# Patient Record
Sex: MALE | Race: WHITE | NOT HISPANIC OR LATINO | URBAN - METROPOLITAN AREA
[De-identification: names, ages, dates, MRNs, and addresses within clinical notes are randomized per-mention and may not be internally consistent; named-entity substitution may affect disease eponyms.]

---

## 2017-07-19 ENCOUNTER — GENERIC CONVERSION - ENCOUNTER (OUTPATIENT)
Dept: OTHER | Facility: OTHER | Age: 82
End: 2017-07-19

## 2017-07-20 ENCOUNTER — GENERIC CONVERSION - ENCOUNTER (OUTPATIENT)
Dept: OTHER | Facility: OTHER | Age: 82
End: 2017-07-20

## 2017-09-13 ENCOUNTER — ALLSCRIPTS OFFICE VISIT (OUTPATIENT)
Dept: OTHER | Facility: OTHER | Age: 82
End: 2017-09-13

## 2017-09-13 DIAGNOSIS — I35.1 NONRHEUMATIC AORTIC VALVE INSUFFICIENCY: ICD-10-CM

## 2017-09-20 ENCOUNTER — HOSPITAL ENCOUNTER (OUTPATIENT)
Dept: NON INVASIVE DIAGNOSTICS | Facility: CLINIC | Age: 82
Discharge: HOME | End: 2017-09-20
Payer: MEDICARE

## 2017-09-20 DIAGNOSIS — I35.1 NONRHEUMATIC AORTIC VALVE INSUFFICIENCY: ICD-10-CM

## 2017-09-20 LAB
ARRHY DURING EX: NORMAL
CHEST PAIN STATEMENT: NORMAL
MAX DIASTOLIC BP: 70 MMHG
MAX HEART RATE: 78 BPM
MAX PREDICTED HEART RATE: 133 BPM
MAX. SYSTOLIC BP: 160 MMHG
PROTOCOL NAME: NORMAL
REASON FOR TERMINATION: NORMAL
TARGET HR FORMULA: NORMAL
TEST INDICATION: NORMAL
TIME IN EXERCISE PHASE: 180 S

## 2017-09-20 PROCEDURE — 93017 CV STRESS TEST TRACING ONLY: CPT

## 2017-09-20 PROCEDURE — A9502 TC99M TETROFOSMIN: HCPCS

## 2017-09-20 PROCEDURE — 78452 HT MUSCLE IMAGE SPECT MULT: CPT

## 2017-09-20 RX ADMIN — REGADENOSON 0.4 MG: 0.08 INJECTION, SOLUTION INTRAVENOUS at 13:18

## 2017-09-23 ENCOUNTER — GENERIC CONVERSION - ENCOUNTER (OUTPATIENT)
Dept: OTHER | Facility: OTHER | Age: 82
End: 2017-09-23

## 2017-10-26 NOTE — PROGRESS NOTES
Assessment  1  Paroxysmal atrial fibrillation (427 31) (I48 0)   2  Hyperlipidemia (272 4) (E78 5)   3  NSTEMI (non-ST elevated myocardial infarction) (410 70) (I21 4)   4  Severe tricuspid regurgitation (397 0) (I07 1)   5  Cerebrovascular accident (CVA) (434 91) (I63 9)   6  Moderate aortic regurgitation (424 1) (I35 1)    Plan   · NM MYOCARDIAL PERFUSION SPECT (RX STRESS AND/OR REST); Status:Hold For -  Scheduling; Requested for:[id number];    Perform:[facility]; Due:68Gvz0837; Ordered; For:Moderate aortic regurgitation; Ordered By:Jeffy Hahn;   · Follow-up visit in 3 months Evaluation and Treatment  Follow-up  Status: Hold For -  Scheduling  Requested for: 17Kvj0727   Ordered; For: Moderate aortic regurgitation; Ordered By: Frank Clay Performed:  Due: [id number]    Discussion/Summary    #1  NSTEMI: Currently asymptomaticbeta blocker and statinsget Lexiscan stress test to rule out ischemia  Embolic stroke, paroxysmal A  fibnormal sinus rhythmbeta blocker and Eliquis  Hypertension, blood pressure is reasonable         Chief Complaint  new pt- hospital followup for NSTEMI      History of Present Illness  HPI: 71-year-old male with past medical history of hypertension was recently admitted to [facility] with syncope  While in the hospital, he was found to have stroke  He also had NSTEMI while in the hospital  He denies having any chest pain or shortness of breath with exertion now or prior to admission to the hospital  He was supposed to get cardiac catheterization but it was canceled as he had stroke  He had echo cardiac gram which showed normal LV systolic function, moderate aortic regurgitation and severe tricuspid regurgitation  He did not have any stress test while in the hospital  He was started on Eliquis, beta blocker, statins and losartan and discharged to rehabilitation  Since discharge he is doing well and denies having any chest pain or shortness of breath with exertion   This tolerating Eliquis and denies having any bleeding  She has left lower extremity edema  Review of Systems      Cardiac: has swelling in the , but-- as noted in HPI  Skin: No complaints of nonhealing sores or skin rash  Genitourinary: No complaints of recurrent urinary tract infections, frequent urination at night, difficult urination, blood in urine, kidney stones, loss of bladder control, no kidney or prostate problems, no erectile dysfunction  Psychological: No complaints of feeling depressed, anxiety, panic attacks, or difficulty concentrating  General: No complaints of trouble sleeping, lack of energy, fatigue, appetite changes, weight changes, fever, frequent infections, or night sweats  Respiratory: No complaints of shortness of breath, cough with sputum, or wheezing  HEENT: No complaints of serious problems, hearing problems, nose problems, throat problems, or snoring  Gastrointestinal: No complaints of liver problems, nausea, vomiting, heartburn, constipation, bloody stools, diarrhea, problems swallowing, adbominal pain, or rectal bleeding  Hematologic: No complaints of bleeding disorders, anemia, blood clots, or excessive brusing  Neurological: No complaints of numbness, tingling, dizziness, weakness, seizures, headaches, syncope or fainting, AM fatigue, daytime sleepiness, no witnessed apnea episodes  Musculoskeletal: No complaints of arthritis, back pain, or painfull swelling  ROS reviewed  Active Problems  1  Anemia (285 9) (D64 9)   2  Gout (274 9) (M10 9)   3  Hyperlipidemia (272 4) (E78 5)   4  Paroxysmal atrial fibrillation (427 31) (I48 0)    Past Medical History   · History of hypertension (V12 59) (Z86 79)   · Paroxysmal atrial fibrillation (427 31) (I48 0)    The active problems and past medical history were reviewed and updated today        Surgical History   · History of Hernia Repair   · History of Knee Replacement    The surgical history was reviewed and updated today  Family History  Mother    · No pertinent family history  Father    · No pertinent family history    The family history was reviewed and updated today  Social History   · Never a smoker   · No alcohol use   · No illicit drug use  The social history was reviewed and updated today  The social history was reviewed and is unchanged  Current Meds   1  Allopurinol 100 MG Oral Tablet; TAKE 1 TABLET 3 TIMES DAILY; Therapy: 13Sep2017 to Recorded   2  AmLODIPine Besylate 5 MG Oral Tablet; take 1 tablet by mouth every day; Therapy: 13Sep2017 to (Evaluate:12Mar2018) Recorded   3  Atorvastatin Calcium 10 MG Oral Tablet; Therapy: 13Sep2017 to Recorded   4  Doxazosin Mesylate 1 MG Oral Tablet; TAKE 1 TABLET DAILY AS DIRECTED; Therapy: 13Sep2017 to Recorded   5  Eliquis 5 MG Oral Tablet; Take 1 tablet twice daily; Therapy: 13Sep2017 to (Evaluate:08Sep2018) Recorded   6  Ferrous Sulfate 325 (65 Fe) MG Oral Tablet; TAKE 1 TABLET TWICE DAILY; Therapy: 13Sep2017 to Recorded   7  Losartan Potassium 25 MG Oral Tablet; TAKE 1 TABLET DAILY AS DIRECTED; Therapy: 13Sep2017 to Recorded   8  Metoprolol Succinate ER 50 MG Oral Tablet Extended Release 24 Hour; TAKE 1 TABLET   DAILY; Therapy: 13Sep2017 to (Evaluate:12Mar2018) Recorded    The medication list was reviewed and updated today  Allergies  1  No Known Drug Allergies    Vitals  Signs   Heart Rate: 50  Systolic: 230  Diastolic: 76  Height: 5 ft 8 in  Weight: 162 lb   BMI Calculated: 24 63  BSA Calculated: 1 87  O2 Saturation: 100    Physical Exam    Constitutional   General appearance: No acute distress, well appearing and well nourished  Eyes   Conjunctiva and Sclera examination: Conjunctiva pink, sclera anicteric  Ears, Nose, Mouth, and Throat - Oropharynx: Clear, nares are clear, mucous membranes are moist    Neck   Neck and thyroid: Normal, supple, trachea midline, no thyromegaly      Pulmonary   Respiratory effort: No increased work of breathing or signs of respiratory distress  Auscultation of lungs: Clear to auscultation, no rales, no rhonchi, no wheezing, good air movement  Cardiovascular   Auscultation of heart: Normal rate and rhythm, normal S1 and S2, no murmurs  Carotid pulses: Normal, 2+ bilaterally  Peripheral vascular exam: Normal pulses throughout, no tenderness, erythema or swelling  Pedal pulses: Normal, 2+ bilaterally  Examination of extremities for edema and/or varicosities: Abnormal   right ankle 1+ pitting edema-- and-- left ankle 2+ pitting edema  Abdomen   Abdomen: Non-tender and no distention  Liver and spleen: No hepatomegaly or splenomegaly  Musculoskeletal Gait and station: Normal gait  -- Digits and nails: Normal without clubbing or cyanosis  -- Inspection/palpation of joints, bones, and muscles: Normal, ROM normal     Skin - Skin and subcutaneous tissue: Normal without rashes or lesions  Skin is warm and well perfused, normal turgor  Neurologic - Cranial nerves: II - XII intact  -- Speech: Normal     Psychiatric - Orientation to person, place, and time: Normal -- Mood and affect: Normal       Results/Data  I personally reviewed the recording/images in the office today  My interpretation follows        Signatures   Electronically signed by : BEVERLEY Mabry ; Sep 13 2017  3:18PM EST                       (Author)

## 2017-11-29 ENCOUNTER — ALLSCRIPTS OFFICE VISIT (OUTPATIENT)
Dept: OTHER | Facility: OTHER | Age: 82
End: 2017-11-29

## 2017-12-28 PROBLEM — Z00.00 ENCOUNTER FOR PREVENTIVE HEALTH EXAMINATION: Status: ACTIVE | Noted: 2017-12-28

## 2018-01-11 NOTE — MISCELLANEOUS
Provider Comments  Provider Comments:   1st no show to neurology appointment  No call or message received  Letter being sent to patient        Signatures   Electronically signed by : Brice Jacobs MD; Nov 29 2017  2:15PM EST                       (Author)

## 2018-01-14 VITALS
HEART RATE: 50 BPM | SYSTOLIC BLOOD PRESSURE: 118 MMHG | OXYGEN SATURATION: 100 % | DIASTOLIC BLOOD PRESSURE: 76 MMHG | BODY MASS INDEX: 24.55 KG/M2 | HEIGHT: 68 IN | WEIGHT: 162 LBS

## 2018-01-18 NOTE — RESULT NOTES
Verified Results  NM MYOCARDIAL PERFUSION SPECT (RX STRESS AND/OR REST) [id number] 11:35AM Naomi Norris Order Number: [id number]    - Patient Instructions: To schedule this appointment, please contact Central Scheduling at [phone]  Test Name Result Flag Reference   NM MYOCARDIAL PERFUSION SPECT (RX STRESS AND/OR REST) (Report)     [facility] [address] [place] [address], [address] [place] [address]   [phone]     Rest/Stress Gated SPECT Myocardial Perfusion Imaging After Regadenoson     Patient: Luis Torres   MR number: [de-identified]   Account number: [de-identified]   : 1930   Age: 80 years   Gender: Male   Status: Outpatient   Location: [facility]   Height: 68 in   Weight: 162 lb   BP: 160/ 70 mmHg     Allergies: ALLERGIES NOT ON FILE     Diagnosis: I21 4 - Non-ST elevation (NSTEMI) myocardial infarction, I48 0 - Atrial fibrillation     Primary Physician: Darling Mcleod DO   Interpreting Physician: Familia Erwin MD   Referring Physician: Familia Erwin MD   Technician: Mackenzie Ibarra BS, BA, AART(N)   Group: [facility]   Other: Denisse Anglin MS, CCT     INDICATIONS: NSTEMI, A-FIB Evaluation of known coronary artery disease  HISTORY: The patient is a 80year old  male  Chest pain status: no chest pain  Other symptoms: palpitations  Coronary artery disease risk factors: dyslipidemia  Cardiovascular history: coronary artery disease, prior myocardial   infarction, arrhythmia, and stroke  Medications: a beta blocker, a lipid lowering agent, and an antihypertensive agent  REST ECG: Normal sinus rhythm  PROCEDURE: The study was performed at [address] [place] [address]  A regadenoson infusion pharmacologic stress test was performed  Gated SPECT myocardial perfusion imaging was performed after stress and at rest  Systolic blood pressure   was 160 mmHg, at the start of the study   Diastolic blood pressure was 70 mmHg, at the start of the study  The heart rate was 63 bpm, at the start of the study  Regadenoson protocol:   HR bpm SBP mmHg DBP mmHg   Baseline 63 160 70   Immediate 75 142 60   1 min 73 -- --   2 min 74 136 60   No medications or fluids given  STRESS SUMMARY: Duration of pharmacologic stress was 3 min  Maximal heart rate during stress was 75 bpm  The rate-pressure product for the peak heart rate and blood pressure was 66409  There was no chest pain during stress  The stress test   was terminated due to protocol completion  The stress ECG was negative for ischemia  There were no stress arrhythmias or conduction abnormalities  ISOTOPE ADMINISTRATION:   Resting isotope administration Stress isotope administration   Agent Tetrofosmin Tetrofosmin   Dose 10 82 mCi 32 4 mCi   Date 09/20/2017 09/20/2017   Injection-image interval 30 min 45 min     PERFUSION DEFECTS:   - There were no perfusion defects  GATED SPECT:   The calculated left ventricular ejection fraction was 62 %  Left ventricular ejection fraction was within normal limits by visual estimate  There was no left ventricular regional abnormality  SUMMARY:   - Stress results: There was no chest pain during stress  - ECG conclusions: The stress ECG was negative for ischemia  - Perfusion imaging: There were no perfusion defects    - Gated SPECT: The calculated left ventricular ejection fraction was 62 %  Left ventricular ejection fraction was within normal limits by visual estimate  There was no left ventricular regional abnormality  IMPRESSIONS: Normal study  Myocardial perfusion imaging was normal at rest and with stress       Prepared and signed by     Jr Koroma MD   Signed 09/21/2017 18:44:58

## 2018-01-23 NOTE — MISCELLANEOUS
Provider Comments  Provider Comments:   1st no show to neurology appointment  No call or message received  Letter sent to patient        Signatures   Electronically signed by : Dung Hoyos MD; Dec  6 2017 11:22AM EST                       (Author)

## 2018-01-31 ENCOUNTER — APPOINTMENT (OUTPATIENT)
Dept: OPHTHALMOLOGY | Facility: CLINIC | Age: 83
End: 2018-01-31
Payer: MEDICARE

## 2018-01-31 PROCEDURE — 92014 COMPRE OPH EXAM EST PT 1/>: CPT

## 2018-01-31 PROCEDURE — 92020 GONIOSCOPY: CPT

## 2018-01-31 PROCEDURE — 92225: CPT | Mod: RT

## 2018-01-31 PROCEDURE — 92083 EXTENDED VISUAL FIELD XM: CPT

## 2018-01-31 PROCEDURE — 92250 FUNDUS PHOTOGRAPHY W/I&R: CPT

## 2018-02-14 ENCOUNTER — APPOINTMENT (OUTPATIENT)
Dept: OPHTHALMOLOGY | Facility: CLINIC | Age: 83
End: 2018-02-14
Payer: MEDICARE

## 2018-02-14 PROCEDURE — 92225: CPT | Mod: RT

## 2018-02-14 PROCEDURE — 92134 CPTRZ OPH DX IMG PST SGM RTA: CPT

## 2018-02-14 PROCEDURE — 92014 COMPRE OPH EXAM EST PT 1/>: CPT

## 2018-03-08 ENCOUNTER — APPOINTMENT (OUTPATIENT)
Dept: OPHTHALMOLOGY | Facility: CLINIC | Age: 83
End: 2018-03-08

## 2018-04-18 ENCOUNTER — APPOINTMENT (OUTPATIENT)
Dept: OPHTHALMOLOGY | Facility: CLINIC | Age: 83
End: 2018-04-18
Payer: MEDICARE

## 2018-04-18 DIAGNOSIS — H35.3131 NONEXUDATIVE AGE-RELATED MACULAR DEGENERATION, BILATERAL, EARLY DRY STAGE: ICD-10-CM

## 2018-04-18 DIAGNOSIS — Z96.1 PRESENCE OF INTRAOCULAR LENS: ICD-10-CM

## 2018-04-18 DIAGNOSIS — H43.813 VITREOUS DEGENERATION, BILATERAL: ICD-10-CM

## 2018-04-18 DIAGNOSIS — H34.8312 TRIBUTARY (BRANCH) RETINAL VEIN OCCLUSION, RIGHT EYE, STABLE: ICD-10-CM

## 2018-04-18 DIAGNOSIS — H35.371 PUCKERING OF MACULA, RIGHT EYE: ICD-10-CM

## 2018-04-18 PROCEDURE — 92250 FUNDUS PHOTOGRAPHY W/I&R: CPT

## 2018-04-18 PROCEDURE — 92235 FLUORESCEIN ANGRPH MLTIFRAME: CPT

## 2018-04-18 PROCEDURE — 92012 INTRM OPH EXAM EST PATIENT: CPT

## 2018-05-17 ENCOUNTER — APPOINTMENT (OUTPATIENT)
Dept: OPHTHALMOLOGY | Facility: CLINIC | Age: 83
End: 2018-05-17
Payer: MEDICARE

## 2018-05-17 DIAGNOSIS — H40.003 PREGLAUCOMA, UNSPECIFIED, BILATERAL: ICD-10-CM

## 2018-05-17 PROCEDURE — 92012 INTRM OPH EXAM EST PATIENT: CPT

## 2021-01-22 DIAGNOSIS — Z23 ENCOUNTER FOR IMMUNIZATION: ICD-10-CM

## 2021-01-28 ENCOUNTER — IMMUNIZATIONS (OUTPATIENT)
Dept: FAMILY MEDICINE CLINIC | Facility: HOSPITAL | Age: 86
End: 2021-01-28

## 2021-01-28 DIAGNOSIS — Z23 ENCOUNTER FOR IMMUNIZATION: Primary | ICD-10-CM

## 2021-01-28 PROCEDURE — 91301 SARS-COV-2 / COVID-19 MRNA VACCINE (MODERNA) 100 MCG: CPT

## 2021-01-28 PROCEDURE — 0011A SARS-COV-2 / COVID-19 MRNA VACCINE (MODERNA) 100 MCG: CPT

## 2021-02-24 ENCOUNTER — IMMUNIZATIONS (OUTPATIENT)
Dept: FAMILY MEDICINE CLINIC | Facility: HOSPITAL | Age: 86
End: 2021-02-24

## 2021-02-24 DIAGNOSIS — Z23 ENCOUNTER FOR IMMUNIZATION: Primary | ICD-10-CM

## 2021-02-24 PROCEDURE — 91301 SARS-COV-2 / COVID-19 MRNA VACCINE (MODERNA) 100 MCG: CPT

## 2021-02-24 PROCEDURE — 0012A SARS-COV-2 / COVID-19 MRNA VACCINE (MODERNA) 100 MCG: CPT

## 2021-11-17 ENCOUNTER — IMMUNIZATIONS (OUTPATIENT)
Dept: FAMILY MEDICINE CLINIC | Facility: HOSPITAL | Age: 86
End: 2021-11-17

## 2021-11-17 DIAGNOSIS — Z23 ENCOUNTER FOR IMMUNIZATION: Primary | ICD-10-CM

## 2021-11-17 PROCEDURE — 0064A COVID-19 MODERNA VACC 0.25 ML BOOSTER: CPT

## 2021-11-17 PROCEDURE — 91306 COVID-19 MODERNA VACC 0.25 ML BOOSTER: CPT
